# Patient Record
Sex: FEMALE | Race: ASIAN
[De-identification: names, ages, dates, MRNs, and addresses within clinical notes are randomized per-mention and may not be internally consistent; named-entity substitution may affect disease eponyms.]

---

## 2021-12-27 ENCOUNTER — APPOINTMENT (OUTPATIENT)
Dept: VASCULAR SURGERY | Facility: CLINIC | Age: 71
End: 2021-12-27
Payer: MEDICARE

## 2021-12-27 ENCOUNTER — APPOINTMENT (OUTPATIENT)
Dept: VASCULAR SURGERY | Facility: CLINIC | Age: 71
End: 2021-12-27

## 2021-12-27 VITALS
HEIGHT: 62 IN | DIASTOLIC BLOOD PRESSURE: 82 MMHG | TEMPERATURE: 96.62 F | WEIGHT: 153 LBS | BODY MASS INDEX: 28.16 KG/M2 | SYSTOLIC BLOOD PRESSURE: 142 MMHG | HEART RATE: 76 BPM

## 2021-12-27 PROCEDURE — 93970 EXTREMITY STUDY: CPT

## 2021-12-27 PROCEDURE — 99203 OFFICE O/P NEW LOW 30 MIN: CPT

## 2021-12-27 NOTE — ASSESSMENT
[FreeTextEntry1] : In summary, Mrs. Dyson presents with hyperpigmentation changes of the legs. She has no evidence of arterial insufficiency. A venous duplex was performed in the office today and showed no evidence of DVT or SVT. There is reflux in bilateral accessory saphenous veins, which do not meet size criteria for intervention and reflux in bilateral mid-calf great saphenous veins (no intervention ). I provided her a prescription for knee high, 20-30mmHg compression stockings and instructed her to wear these daily and remove at night. She should keep her legs elevated when possible and exercise regularly. She will follow up with me in six months.

## 2021-12-27 NOTE — PHYSICAL EXAM
[Normal Breath Sounds] : Normal breath sounds [Normal Heart Sounds] : normal heart sounds [2+] : right 2+ [de-identified] : NAD. Neurologically intact. [de-identified] : WNL [FreeTextEntry1] : Hyperpigmentation changes of both ankles and feet. No open wounds or edema present.

## 2021-12-27 NOTE — HISTORY OF PRESENT ILLNESS
[FreeTextEntry1] : Mrs. Dyson is a 71-year old [place] citizen speaking lady who is accompanied by her son, who is acting as . She reports that her feet have felt itchy since March of this year. She has also noted some hyperpigmentation of the feet and ankles. She denies any history of edema or ulcers. She denies any symptoms of claudication, rest pain, or tissue loss. She underwent "vein procedures" on both legs at an outside facility two years ago. She does not presently wear compression stockings.\par \par She denies any history of CAD, CHF, CVA, TIA or CRI.\par \par PMH: HTN, DMII, DJD, broken right arm\par \par Meds: Norvasc, Lipitor, Candesartan-HCTZ, Klonopin, Ibandronate, Janumet, Propranolol\par \par All: NKDA\par \par FH: NC\par \par SH: non-smoker

## 2022-01-20 ENCOUNTER — APPOINTMENT (OUTPATIENT)
Dept: VASCULAR SURGERY | Facility: CLINIC | Age: 72
End: 2022-01-20

## 2022-07-26 ENCOUNTER — APPOINTMENT (OUTPATIENT)
Dept: VASCULAR SURGERY | Facility: CLINIC | Age: 72
End: 2022-07-26

## 2022-07-26 VITALS
DIASTOLIC BLOOD PRESSURE: 80 MMHG | HEIGHT: 62 IN | HEART RATE: 66 BPM | TEMPERATURE: 208.4 F | WEIGHT: 155 LBS | BODY MASS INDEX: 28.52 KG/M2 | SYSTOLIC BLOOD PRESSURE: 129 MMHG

## 2022-07-26 PROCEDURE — 93970 EXTREMITY STUDY: CPT

## 2022-07-26 PROCEDURE — 99213 OFFICE O/P EST LOW 20 MIN: CPT

## 2022-07-26 NOTE — PHYSICAL EXAM
[Normal Breath Sounds] : Normal breath sounds [Normal Heart Sounds] : normal heart sounds [2+] : left 2+ [de-identified] : NAD. Neurologically intact. [de-identified] : WNL [FreeTextEntry1] : Hyperpigmentation changes of both ankles and feet. No open wounds or edema present. Spider veins of both thighs.

## 2022-07-26 NOTE — ASSESSMENT
[FreeTextEntry1] : In summary, Mrs. Dyson presents with a history of lower extremity venous insufficiency. A venous duplex was performed today and showed:\par -No evidence of DVT or SVT\par -Right: reflux in the distal thigh GSV only and calf varicosities\par -Left: reflux in short segment AGSV\par -Ablated bilateral SSV and left GSV\par \par I provided her a new rx for knee high compression stockings and instructed her to wear these during the day and remove at night. She should keep her legs elevated when possible and exercise regularly. I instructed her to wear sunscreen to prevent further skin hyperpigmentation.

## 2022-07-26 NOTE — HISTORY OF PRESENT ILLNESS
[FreeTextEntry1] : Mrs. Dyson is a 71-year old [place] speaking lady presents with a history of lower extremity venous insufficiency. She underwent "vein procedures" on both legs at an outside facility. Prior studies show evidence of ablation of the left GSV and bilateral SSV.  I prescribed her compression stockings during her initial visit, but she has been non-compliant with use. She denies any leg edema, varicose veins, or wounds. She complains of pruritus over spider veins of both thighs and legs and pain in her knee joints. She has hyperpigmentation changes of the feet and ankles. She denies any symptoms of claudication, rest pain, or tissue loss. \par She denies any history of CAD, CHF, CVA, TIA or CRI.\par \par PMH: HTN, DMII, DJD, broken right arm\par \par Meds: Norvasc, Lipitor, Candesartan-HCTZ, Klonopin, Ibandronate, Janumet, Propranolol\par \par All: NKDA\par \par FH: NC\par \par SH: non-smoker